# Patient Record
Sex: MALE | Race: WHITE | Employment: FULL TIME | URBAN - METROPOLITAN AREA
[De-identification: names, ages, dates, MRNs, and addresses within clinical notes are randomized per-mention and may not be internally consistent; named-entity substitution may affect disease eponyms.]

---

## 2017-01-25 ENCOUNTER — TELEPHONE (OUTPATIENT)
Dept: FAMILY MEDICINE CLINIC | Facility: CLINIC | Age: 42
End: 2017-01-25

## 2017-01-25 DIAGNOSIS — Z13.220 LIPID SCREENING: Primary | ICD-10-CM

## 2017-01-25 NOTE — TELEPHONE ENCOUNTER
Physical Exam on 3/31/17. ..no changes in health and will go to [facility] for bld work. Place labs please

## 2017-02-23 NOTE — TELEPHONE ENCOUNTER
Pt requesting refill on inhaler, has physical scheduled on 3/31/17, no recent aap or qualifying act score. Will you authorize refill? Please advise.  Routed to Dr Cyrus Fofana

## 2017-03-13 ENCOUNTER — APPOINTMENT (OUTPATIENT)
Dept: LAB | Age: 42
End: 2017-03-13
Attending: FAMILY MEDICINE
Payer: COMMERCIAL

## 2017-03-13 DIAGNOSIS — Z13.220 LIPID SCREENING: ICD-10-CM

## 2017-03-13 LAB
ALBUMIN SERPL-MCNC: 4.2 G/DL (ref 3.5–4.8)
ALP LIVER SERPL-CCNC: 68 U/L (ref 45–117)
ALT SERPL-CCNC: 57 U/L (ref 17–63)
AST SERPL-CCNC: 21 U/L (ref 15–41)
BILIRUB SERPL-MCNC: 0.6 MG/DL (ref 0.1–2)
BUN BLD-MCNC: 16 MG/DL (ref 8–20)
CALCIUM BLD-MCNC: 9.4 MG/DL (ref 8.3–10.3)
CHLORIDE: 105 MMOL/L (ref 101–111)
CHOLEST SMN-MCNC: 220 MG/DL (ref ?–200)
CO2: 27 MMOL/L (ref 22–32)
CREAT BLD-MCNC: 1.03 MG/DL (ref 0.7–1.3)
GLUCOSE BLD-MCNC: 91 MG/DL (ref 70–99)
HDLC SERPL-MCNC: 58 MG/DL (ref 45–?)
HDLC SERPL: 3.79 {RATIO} (ref ?–4.97)
LDLC SERPL CALC-MCNC: 128 MG/DL (ref ?–130)
M PROTEIN MFR SERPL ELPH: 7.5 G/DL (ref 6.1–8.3)
NONHDLC SERPL-MCNC: 162 MG/DL (ref ?–130)
POTASSIUM SERPL-SCNC: 3.9 MMOL/L (ref 3.6–5.1)
SODIUM SERPL-SCNC: 140 MMOL/L (ref 136–144)
TRIGLYCERIDES: 172 MG/DL (ref ?–150)
VLDL: 34 MG/DL (ref 5–40)

## 2017-03-13 PROCEDURE — 80061 LIPID PANEL: CPT

## 2017-03-13 PROCEDURE — 36415 COLL VENOUS BLD VENIPUNCTURE: CPT

## 2017-03-13 PROCEDURE — 80053 COMPREHEN METABOLIC PANEL: CPT

## 2017-03-31 ENCOUNTER — OFFICE VISIT (OUTPATIENT)
Dept: FAMILY MEDICINE CLINIC | Facility: CLINIC | Age: 42
End: 2017-03-31

## 2017-03-31 VITALS
RESPIRATION RATE: 12 BRPM | HEART RATE: 84 BPM | SYSTOLIC BLOOD PRESSURE: 122 MMHG | DIASTOLIC BLOOD PRESSURE: 64 MMHG | BODY MASS INDEX: 28.1 KG/M2 | HEIGHT: 73 IN | TEMPERATURE: 98 F | WEIGHT: 212 LBS

## 2017-03-31 DIAGNOSIS — J30.2 OTHER SEASONAL ALLERGIC RHINITIS: ICD-10-CM

## 2017-03-31 DIAGNOSIS — Z00.00 ANNUAL PHYSICAL EXAM: Primary | ICD-10-CM

## 2017-03-31 DIAGNOSIS — G47.33 OBSTRUCTIVE SLEEP APNEA: ICD-10-CM

## 2017-03-31 PROCEDURE — 99396 PREV VISIT EST AGE 40-64: CPT | Performed by: FAMILY MEDICINE

## 2017-03-31 NOTE — PROGRESS NOTES
No chief complaint on file. HPI:   Ankita Servin is a 40year old male with MPH JUAN, rhinitis who presents for a complete physical exam.     JUAN - on CPAP. He goes on and off this, most recently off of the mask. PNA end of last year. PILONIDAL LESION COMPLIC  [id number]    ARTHROSCOPY OF JOINT UNLISTED  1992    Comment left knee      Family History   Problem Relation Age of Onset   • Cancer Maternal Grandmother    • Other[other] [OTHER] Maternal Grandfather [de-identified]     alzheimers   • Heart Disor strength and tone. Normal symmetric reflexes. Normal coordination and gait    ASSESSMENT AND PLAN:     Conchis Ba was seen in the office today:  had no chief complaint listed for this encounter.     1. Annual physical exam  overallhealthy  Die

## 2017-03-31 NOTE — PATIENT INSTRUCTIONS
Prevention Guidelines, Men Ages 36 to 52  Screening tests and vaccines are an important part of managing your health. Health counseling is essential, too. Below are guidelines for these, for men ages 36 to 52.  Talk with your healthcare provider to make s Hepatitis A Men at increased risk for infection – talk with your healthcare provider 2 doses given at least 6 months apart   Hepatitis B Men at increased risk for infection – talk with your healthcare provider 3 doses over 6 months; second dose should be g © 3856-5962 [address], [address] [place]. All rights reserved. This information is not intended as a substitute for professional medical care. Always follow your healthcare professional's instructions.

## 2017-04-27 ENCOUNTER — HOSPITAL ENCOUNTER (OUTPATIENT)
Dept: GENERAL RADIOLOGY | Age: 42
Discharge: HOME | End: 2017-04-27
Attending: PHYSICIAN ASSISTANT
Payer: COMMERCIAL

## 2017-04-27 ENCOUNTER — OFFICE VISIT (OUTPATIENT)
Dept: FAMILY MEDICINE CLINIC | Facility: CLINIC | Age: 42
End: 2017-04-27

## 2017-04-27 VITALS
HEART RATE: 80 BPM | HEIGHT: 73 IN | DIASTOLIC BLOOD PRESSURE: 80 MMHG | SYSTOLIC BLOOD PRESSURE: 122 MMHG | RESPIRATION RATE: 16 BRPM | TEMPERATURE: 98 F | WEIGHT: 213 LBS | BODY MASS INDEX: 28.23 KG/M2

## 2017-04-27 DIAGNOSIS — M54.2 NECK PAIN: Primary | ICD-10-CM

## 2017-04-27 DIAGNOSIS — M25.512 ACUTE PAIN OF LEFT SHOULDER: ICD-10-CM

## 2017-04-27 DIAGNOSIS — M54.2 NECK PAIN: ICD-10-CM

## 2017-04-27 DIAGNOSIS — M54.12 CERVICAL RADICULOPATHY: ICD-10-CM

## 2017-04-27 DIAGNOSIS — M79.602 LEFT ARM PAIN: ICD-10-CM

## 2017-04-27 PROCEDURE — 72050 X-RAY EXAM NECK SPINE 4/5VWS: CPT

## 2017-04-27 PROCEDURE — 99213 OFFICE O/P EST LOW 20 MIN: CPT | Performed by: PHYSICIAN ASSISTANT

## 2017-04-27 RX ORDER — CYCLOBENZAPRINE HCL 10 MG
10 TABLET ORAL 3 TIMES DAILY
Qty: 30 TABLET | Refills: 0 | Status: SHIPPED | OUTPATIENT
Start: 2017-04-27 | End: 2017-05-17

## 2017-04-27 RX ORDER — METHYLPREDNISOLONE 4 MG/1
TABLET ORAL
Qty: 1 KIT | Refills: 0 | Status: SHIPPED | OUTPATIENT
Start: 2017-04-27 | End: 2017-06-12

## 2017-04-27 NOTE — PROGRESS NOTES
CC:  Patient presents with:  Shoulder Pain: x1 week w/ left shoulder pain . Pt states dull ache and certian movements increas pain that go down entire arm. Pain level 8-9/10.        HPI: Diogenes Sanchez presents for complaints of left neck and shoulder pain extendin HPI    Physical Exam :  /80 mmHg  Pulse 80  Temp(Src) 98.3 °F (36.8 °C)  Resp 16  Ht 73\"  Wt 213 lb  BMI 28.11 kg/m2    Vital signs reviewed.     Constitutional: Well developed, well nourished; in no acute distress  HEENT:  Head: Normocephalic, atrau Allergic rhinitis      No orders of the defined types were placed in this encounter. Signed Prescriptions Disp Refills    Cyclobenzaprine HCl 10 MG Oral Tab 30 tablet 0      Sig: Take 1 tablet (10 mg total) by mouth 3 (three) times daily.       jim

## 2017-04-27 NOTE — PATIENT INSTRUCTIONS
Understanding Cervical Radiculopathy    Cervical radiculopathy is irritation or inflammation of a nerve root in the neck. It causes neck pain and other symptoms that may spread into the chest or down the arm.  To understand this condition, it helps to und In most cases, your healthcare provider will first try treatments that help relieve symptoms. These may include:  · Prescription or over-the-counter pain medicines. These help relieve pain and swelling. · Cold packs. These help reduce pain. · Resting.  Cynthia Barraganast

## 2017-05-04 ENCOUNTER — TELEPHONE (OUTPATIENT)
Dept: FAMILY MEDICINE CLINIC | Facility: CLINIC | Age: 42
End: 2017-05-04

## 2017-05-04 NOTE — TELEPHONE ENCOUNTER
Medication is still not working on the pain. It is reduced but not completely healed what is the next step? Physical Therapy? He saw [facility] on 4/27/17.

## 2017-05-04 NOTE — TELEPHONE ENCOUNTER
LOV 4/27/17. Pt states that his neck pain is improving, but it is still present. Level 3-4 intermittent pain. Pt is asking if he can go for physical therapy.     Routed to [facility]

## 2017-05-08 ENCOUNTER — HOSPITAL ENCOUNTER (OUTPATIENT)
Dept: PHYSICAL THERAPY | Facility: HOSPITAL | Age: 42
Setting detail: THERAPIES SERIES
Discharge: HOME | End: 2017-05-08
Attending: PHYSICIAN ASSISTANT
Payer: COMMERCIAL

## 2017-05-08 DIAGNOSIS — M54.12 LEFT CERVICAL RADICULOPATHY: ICD-10-CM

## 2017-05-08 DIAGNOSIS — M54.2 NECK PAIN ON LEFT SIDE: Primary | ICD-10-CM

## 2017-05-08 PROCEDURE — 97110 THERAPEUTIC EXERCISES: CPT

## 2017-05-08 PROCEDURE — 97161 PT EVAL LOW COMPLEX 20 MIN: CPT

## 2017-05-08 NOTE — PROGRESS NOTES
SPINE EVALUATION:   Referring Physician: Dr. Evert Spaulding  Diagnosis: Neck pain with L Cervical Radiculopathy     Date of Service: 5/8/2017     PATIENT SUMMARY   Devin Merchant is a 40year old y/o male who presents to therapy today with complaints R 62; L 58    Accessory motion: decreased segmental L rotation, decreased segmental lateral glide, decreased thoracic PA mobility  Palpation: tightness/tenderness present throughout cervical paraspinals, L UT, L levator, and L parascapular region    Georgiana Aranda Potential:good    Patient/Family/Caregiver was advised of these findings, precautions, and treatment options and has agreed to actively participate in planning and for this course of care.     Thank you for your referral. Please co-sign or sign and return t

## 2017-05-15 ENCOUNTER — HOSPITAL ENCOUNTER (OUTPATIENT)
Dept: PHYSICAL THERAPY | Facility: HOSPITAL | Age: 42
Setting detail: THERAPIES SERIES
Discharge: HOME | End: 2017-05-15
Attending: PHYSICIAN ASSISTANT
Payer: COMMERCIAL

## 2017-05-15 PROCEDURE — 97110 THERAPEUTIC EXERCISES: CPT

## 2017-05-15 PROCEDURE — 97140 MANUAL THERAPY 1/> REGIONS: CPT

## 2017-05-15 NOTE — PROGRESS NOTES
Dx: neck pain L       Authorized # of Visits: -       Next MD visit: none scheduled  Fall Risk: standard         Precautions: n/a             Subjective: felt good for the first couple days after treatment but then pain returned to pre-treatment level.  Bobby

## 2017-05-22 ENCOUNTER — APPOINTMENT (OUTPATIENT)
Dept: PHYSICAL THERAPY | Facility: HOSPITAL | Age: 42
End: 2017-05-22
Attending: PHYSICIAN ASSISTANT
Payer: COMMERCIAL

## 2017-05-25 ENCOUNTER — HOSPITAL ENCOUNTER (OUTPATIENT)
Dept: PHYSICAL THERAPY | Facility: HOSPITAL | Age: 42
Setting detail: THERAPIES SERIES
Discharge: HOME | End: 2017-05-25
Attending: PHYSICIAN ASSISTANT
Payer: COMMERCIAL

## 2017-05-25 PROCEDURE — 97012 MECHANICAL TRACTION THERAPY: CPT

## 2017-05-25 PROCEDURE — 97110 THERAPEUTIC EXERCISES: CPT

## 2017-05-25 PROCEDURE — 97140 MANUAL THERAPY 1/> REGIONS: CPT

## 2017-05-25 NOTE — PROGRESS NOTES
Dx: neck pain w/ L cervical radiculopathy         Authorized # of Visits:  [facility] required after 25th visit         Next MD visit: none scheduled  Fall Risk: standard         Precautions: n/a             Subjective: Patient reports he is noting improved cerv

## 2017-06-01 ENCOUNTER — HOSPITAL ENCOUNTER (OUTPATIENT)
Dept: PHYSICAL THERAPY | Facility: HOSPITAL | Age: 42
Setting detail: THERAPIES SERIES
Discharge: HOME | End: 2017-06-01
Attending: PHYSICIAN ASSISTANT
Payer: COMMERCIAL

## 2017-06-01 ENCOUNTER — TELEPHONE (OUTPATIENT)
Dept: FAMILY MEDICINE CLINIC | Facility: CLINIC | Age: 42
End: 2017-06-01

## 2017-06-01 PROCEDURE — 97110 THERAPEUTIC EXERCISES: CPT

## 2017-06-01 PROCEDURE — 97012 MECHANICAL TRACTION THERAPY: CPT

## 2017-06-01 PROCEDURE — 97140 MANUAL THERAPY 1/> REGIONS: CPT

## 2017-06-01 NOTE — PROGRESS NOTES
Dx: neck pain w/ L cervical radiculopathy         Authorized # of Visits:  Irvin Joiner required after 25th visit         Next MD visit: none scheduled  Fall Risk: standard         Precautions: n/a             Subjective:  Patient reports the pain in his neck is d 2x10         BTB scap retrxn upright row 2x15                              Charges: Man, JASMYN, TRXN  Total Timed Treatment: 45 min     Total Treatment Time: 45 min

## 2017-06-02 NOTE — TELEPHONE ENCOUNTER
Called [facility] to call back to make an appointment with Fred Ashley PA-c

## 2017-06-05 ENCOUNTER — APPOINTMENT (OUTPATIENT)
Dept: PHYSICAL THERAPY | Facility: HOSPITAL | Age: 42
End: 2017-06-05
Attending: PHYSICIAN ASSISTANT
Payer: COMMERCIAL

## 2017-06-08 ENCOUNTER — APPOINTMENT (OUTPATIENT)
Dept: PHYSICAL THERAPY | Facility: HOSPITAL | Age: 42
End: 2017-06-08
Attending: PHYSICIAN ASSISTANT
Payer: COMMERCIAL

## 2017-06-12 ENCOUNTER — OFFICE VISIT (OUTPATIENT)
Dept: FAMILY MEDICINE CLINIC | Facility: CLINIC | Age: 42
End: 2017-06-12

## 2017-06-12 VITALS
BODY MASS INDEX: 28.67 KG/M2 | RESPIRATION RATE: 16 BRPM | SYSTOLIC BLOOD PRESSURE: 110 MMHG | WEIGHT: 214 LBS | DIASTOLIC BLOOD PRESSURE: 70 MMHG | TEMPERATURE: 98 F | HEIGHT: 72.5 IN | HEART RATE: 84 BPM

## 2017-06-12 DIAGNOSIS — M54.12 CERVICAL RADICULOPATHY: ICD-10-CM

## 2017-06-12 DIAGNOSIS — M54.2 NECK PAIN: Primary | ICD-10-CM

## 2017-06-12 PROCEDURE — 99213 OFFICE O/P EST LOW 20 MIN: CPT | Performed by: PHYSICIAN ASSISTANT

## 2017-06-12 NOTE — PROGRESS NOTES
CC:  Patient presents with:  Neck Pain: since starting PT two weeks ago, pain in neck has become worse, wants to discuss MRI      HPI: Naa Guzman presents with complaints of L neck pain with numbness and tingling into his left arm.  XRs were negative a few week perfusion  Pulmonary/Chest: No audible wheezing or labored breathing  Abdominal: No distention  Musculoskeletal:  Normal ROM  Neurological: A&O x 3   Skin: Warm, dry; no rashes, lesions, or discoloration  Psychiatric: Normal mood, affect, behavior, judgeme

## 2017-06-15 ENCOUNTER — APPOINTMENT (OUTPATIENT)
Dept: PHYSICAL THERAPY | Facility: HOSPITAL | Age: 42
End: 2017-06-15
Attending: PHYSICIAN ASSISTANT
Payer: COMMERCIAL

## 2017-06-18 ENCOUNTER — HOSPITAL ENCOUNTER (OUTPATIENT)
Dept: MRI IMAGING | Facility: HOSPITAL | Age: 42
Discharge: HOME | End: 2017-06-18
Attending: PHYSICIAN ASSISTANT
Payer: COMMERCIAL

## 2017-06-18 DIAGNOSIS — M54.12 CERVICAL RADICULOPATHY: ICD-10-CM

## 2017-06-18 DIAGNOSIS — M54.2 NECK PAIN: ICD-10-CM

## 2017-06-18 PROCEDURE — 72141 MRI NECK SPINE W/O DYE: CPT | Performed by: PHYSICIAN ASSISTANT

## 2017-06-22 ENCOUNTER — HOSPITAL ENCOUNTER (OUTPATIENT)
Dept: PHYSICAL THERAPY | Facility: HOSPITAL | Age: 42
Setting detail: THERAPIES SERIES
Discharge: HOME | End: 2017-06-22
Attending: PHYSICIAN ASSISTANT
Payer: COMMERCIAL

## 2017-06-22 PROCEDURE — 97012 MECHANICAL TRACTION THERAPY: CPT

## 2017-06-22 PROCEDURE — 97140 MANUAL THERAPY 1/> REGIONS: CPT

## 2017-06-22 NOTE — PROGRESS NOTES
Dx: neck pain w/ L cervical radiculopathy         Authorized # of Visits:  Naoma John required after 25th visit         Next MD visit: none scheduled  Fall Risk: standard         Precautions: n/a             Subjective:  Patient reports the achy pain in the left TRXN  Total Timed Treatment: 45 min     Total Treatment Time: 45 min

## 2017-06-29 ENCOUNTER — HOSPITAL ENCOUNTER (OUTPATIENT)
Dept: PHYSICAL THERAPY | Facility: HOSPITAL | Age: 42
Setting detail: THERAPIES SERIES
Discharge: HOME | End: 2017-06-29
Attending: PHYSICIAN ASSISTANT
Payer: COMMERCIAL

## 2017-06-29 ENCOUNTER — TELEPHONE (OUTPATIENT)
Dept: FAMILY MEDICINE CLINIC | Facility: CLINIC | Age: 42
End: 2017-06-29

## 2017-06-29 DIAGNOSIS — M54.12 CERVICAL RADICULOPATHY: Primary | ICD-10-CM

## 2017-06-29 PROCEDURE — 97140 MANUAL THERAPY 1/> REGIONS: CPT

## 2017-06-29 PROCEDURE — 97012 MECHANICAL TRACTION THERAPY: CPT

## 2017-06-29 NOTE — TELEPHONE ENCOUNTER
Sravanthi Masters physical therapist from [facility] would like to speak to nurse regarding a prescription for cervical home traction unit.

## 2017-06-29 NOTE — TELEPHONE ENCOUNTER
Physical therapist calling to request DME order for a cervical home traction unit, dx cervical radiculopathy.     Routed to Lorena Crane PA-C for review

## 2017-06-29 NOTE — PROGRESS NOTES
Dx: neck pain w/ L cervical radiculopathy         Authorized # of Visits:  Joelle Campos required after 25th visit         Next MD visit: none scheduled  Fall Risk: standard         Precautions: n/a             Subjective:  Patient reports he feel she continues to Cervical PROM: flxn, SB, rot      L UE neurodynamcs L UE neurodynamcs L UE neurodynamcs L UE neurodynamcs      Mechanical home cervical traction: 10 min @ 25-30# sustained pressure Mechanical home cervical traction: 10 min @ 25-30# sustained pressure Prone

## 2017-07-03 ENCOUNTER — APPOINTMENT (OUTPATIENT)
Dept: PHYSICAL THERAPY | Facility: HOSPITAL | Age: 42
End: 2017-07-03
Attending: PHYSICIAN ASSISTANT
Payer: COMMERCIAL

## 2017-07-13 ENCOUNTER — HOSPITAL ENCOUNTER (OUTPATIENT)
Dept: PHYSICAL THERAPY | Facility: HOSPITAL | Age: 42
Setting detail: THERAPIES SERIES
Discharge: HOME | End: 2017-07-13
Attending: PHYSICIAN ASSISTANT
Payer: COMMERCIAL

## 2017-07-13 PROCEDURE — 97140 MANUAL THERAPY 1/> REGIONS: CPT

## 2017-07-13 PROCEDURE — 97012 MECHANICAL TRACTION THERAPY: CPT

## 2017-07-13 NOTE — PROGRESS NOTES
Dx: neck pain w/ L cervical radiculopathy         Authorized # of Visits:  Donald Turner required after 25th visit         Next MD visit: none scheduled  Fall Risk: standard         Precautions: n/a             Subjective: Patient reports his neck has been doing be stretch : T/Y GTB scap retrxn B ER 2x10 Mechanical home cervical traction: 10 min @ 25-30# sustained pressure Mechanical home cervical traction: 10 min @ 25-30# sustained pressure Mechanical home cervical traction: 10 min @ 25-30# sustained pressure      G

## 2017-10-16 ENCOUNTER — OFFICE VISIT (OUTPATIENT)
Dept: FAMILY MEDICINE CLINIC | Facility: CLINIC | Age: 42
End: 2017-10-16

## 2017-10-16 VITALS
TEMPERATURE: 99 F | BODY MASS INDEX: 27.7 KG/M2 | HEIGHT: 73 IN | HEART RATE: 72 BPM | WEIGHT: 209 LBS | DIASTOLIC BLOOD PRESSURE: 86 MMHG | SYSTOLIC BLOOD PRESSURE: 126 MMHG | RESPIRATION RATE: 16 BRPM

## 2017-10-16 DIAGNOSIS — J02.9 ST (SORE THROAT): Primary | ICD-10-CM

## 2017-10-16 DIAGNOSIS — R09.81 NASAL CONGESTION: ICD-10-CM

## 2017-10-16 DIAGNOSIS — R05.9 COUGH: ICD-10-CM

## 2017-10-16 DIAGNOSIS — J34.89 SINUS PRESSURE: ICD-10-CM

## 2017-10-16 PROCEDURE — 99213 OFFICE O/P EST LOW 20 MIN: CPT | Performed by: PHYSICIAN ASSISTANT

## 2017-10-16 NOTE — PROGRESS NOTES
CC:  Patient presents with:  Sinus Problem: sinus congestion and drainage x 5 days, headache, sinus pressure  Diarrhea: off and on the last few weeks      HPI: Carmen Hawley presents with complaints of a mild ST, sinus pressure, nasal congestion, and a cough with No pain/weakness in arms/legs; normal range of motion  Skin: No rashes or new skin lesions; no unusual moles  Neurological:  No weakness, numbness, or tingling; normal gait and balance  Hematological:  No unusual bruises or bleeding  Psychiatric/Behavioral verbalizes understanding. Patient is notified to call with any questions, complications, allergies, or worsening or changing symptoms. Patient is to call with any side effects or complications from the treatments as a result of today.     Reviewed Past Med

## 2017-10-20 ENCOUNTER — HOSPITAL ENCOUNTER (OUTPATIENT)
Age: 42
Discharge: HOME | End: 2017-10-20
Attending: FAMILY MEDICINE
Payer: COMMERCIAL

## 2017-10-20 VITALS
HEIGHT: 70 IN | BODY MASS INDEX: 30.06 KG/M2 | WEIGHT: 210 LBS | SYSTOLIC BLOOD PRESSURE: 136 MMHG | DIASTOLIC BLOOD PRESSURE: 96 MMHG | TEMPERATURE: 98 F | OXYGEN SATURATION: 99 % | RESPIRATION RATE: 18 BRPM | HEART RATE: 99 BPM

## 2017-10-20 DIAGNOSIS — J01.90 ACUTE SINUSITIS, RECURRENCE NOT SPECIFIED, UNSPECIFIED LOCATION: Primary | ICD-10-CM

## 2017-10-20 DIAGNOSIS — R05.9 COUGH: ICD-10-CM

## 2017-10-20 PROCEDURE — 99214 OFFICE O/P EST MOD 30 MIN: CPT

## 2017-10-20 PROCEDURE — 99213 OFFICE O/P EST LOW 20 MIN: CPT

## 2017-10-20 RX ORDER — DOXYCYCLINE 100 MG/1
100 CAPSULE ORAL 2 TIMES DAILY
Qty: 20 CAPSULE | Refills: 0 | Status: SHIPPED | OUTPATIENT
Start: 2017-10-20 | End: 2017-10-30

## 2017-10-20 NOTE — ED INITIAL ASSESSMENT (HPI)
Pt has been coughing for the past week with sinus drainage. Pt states that he is coughing up mucous and his cough is congested. Pt states that he is traveling for work next week and wants to make sure that he is okay.

## 2017-10-20 NOTE — ED PROVIDER NOTES
Patient Seen in: [facility]    History   Patient presents with:  Cough/URI    Stated Complaint: cough x1 week    The history is provided by the patient. No  was used. Cough   This is a new problem.  Epi Comment: one or two a week      Review of Systems   Constitutional: Positive for fever. Negative for activity change. HENT: Positive for congestion (Purulent drainage), postnasal drip and sinus pressure.  Negative for facial swelling (but complains of fac no discharge. Left eye exhibits no discharge. Neck: Neck supple. Cardiovascular: Normal rate and regular rhythm. Pulmonary/Chest: Effort normal and breath sounds normal.   Lymphadenopathy:     He has no cervical adenopathy.    Neurological: He is veronica

## 2017-10-23 NOTE — TELEPHONE ENCOUNTER
Refill request sent from pharmacy for [facility]. LOV 10/16/17. Was inhaler Rx for Allergic rhinitis ? Routed to Dr Rajesh Green.

## 2017-12-19 ENCOUNTER — OFFICE VISIT (OUTPATIENT)
Dept: FAMILY MEDICINE CLINIC | Facility: CLINIC | Age: 42
End: 2017-12-19

## 2017-12-19 VITALS
BODY MASS INDEX: 27.83 KG/M2 | RESPIRATION RATE: 16 BRPM | DIASTOLIC BLOOD PRESSURE: 82 MMHG | TEMPERATURE: 99 F | HEART RATE: 74 BPM | WEIGHT: 210 LBS | HEIGHT: 73 IN | SYSTOLIC BLOOD PRESSURE: 118 MMHG

## 2017-12-19 DIAGNOSIS — K58.0 IRRITABLE BOWEL SYNDROME WITH DIARRHEA: Primary | ICD-10-CM

## 2017-12-19 PROCEDURE — 99214 OFFICE O/P EST MOD 30 MIN: CPT | Performed by: FAMILY MEDICINE

## 2017-12-19 NOTE — PROGRESS NOTES
Patient presents with:  Change of Bowel Habits: Pt has been having loose BMS, bodyaches and having trouble cathing his breath.  Thinks it is stress related      HPI:   Becky Winter is a 40year old male who presents to the office for stress otoole Work on finding time to do fun things - date nights, movie nights, play with children. Take advantage of time off  Healthy exercise is a good stress relief. Healthy diet  Probiotics may help  If persists, sometimes therapy helps.        Fabio Reynolds

## 2018-02-22 ENCOUNTER — TELEPHONE (OUTPATIENT)
Dept: FAMILY MEDICINE CLINIC | Facility: CLINIC | Age: 43
End: 2018-02-22

## 2018-02-22 DIAGNOSIS — Z13.228 SCREENING FOR ENDOCRINE, METABOLIC AND IMMUNITY DISORDER: ICD-10-CM

## 2018-02-22 DIAGNOSIS — Z13.29 SCREENING FOR ENDOCRINE, METABOLIC AND IMMUNITY DISORDER: ICD-10-CM

## 2018-02-22 DIAGNOSIS — Z13.220 SCREENING, LIPID: ICD-10-CM

## 2018-02-22 DIAGNOSIS — Z13.29 SCREENING FOR THYROID DISORDER: ICD-10-CM

## 2018-02-22 DIAGNOSIS — Z13.0 SCREENING FOR ENDOCRINE, METABOLIC AND IMMUNITY DISORDER: ICD-10-CM

## 2018-02-22 DIAGNOSIS — Z13.0 SCREENING, ANEMIA, DEFICIENCY, IRON: Primary | ICD-10-CM

## 2018-02-26 NOTE — TELEPHONE ENCOUNTER
Called [facility] in detail of labs placed and need to fast for 10-12 hours drink lots of water take his medications but don't eat or drink anything else

## 2018-04-04 ENCOUNTER — LABORATORY ENCOUNTER (OUTPATIENT)
Dept: LAB | Age: 43
End: 2018-04-04
Attending: FAMILY MEDICINE
Payer: COMMERCIAL

## 2018-04-04 DIAGNOSIS — Z13.29 SCREENING FOR ENDOCRINE, METABOLIC AND IMMUNITY DISORDER: ICD-10-CM

## 2018-04-04 DIAGNOSIS — Z13.0 SCREENING FOR ENDOCRINE, METABOLIC AND IMMUNITY DISORDER: ICD-10-CM

## 2018-04-04 DIAGNOSIS — Z13.0 SCREENING, ANEMIA, DEFICIENCY, IRON: ICD-10-CM

## 2018-04-04 DIAGNOSIS — Z13.29 SCREENING FOR THYROID DISORDER: ICD-10-CM

## 2018-04-04 DIAGNOSIS — Z13.220 SCREENING, LIPID: ICD-10-CM

## 2018-04-04 DIAGNOSIS — Z13.228 SCREENING FOR ENDOCRINE, METABOLIC AND IMMUNITY DISORDER: ICD-10-CM

## 2018-04-04 PROCEDURE — 36415 COLL VENOUS BLD VENIPUNCTURE: CPT | Performed by: FAMILY MEDICINE

## 2018-04-04 PROCEDURE — 80061 LIPID PANEL: CPT | Performed by: FAMILY MEDICINE

## 2018-04-04 PROCEDURE — 80050 GENERAL HEALTH PANEL: CPT | Performed by: FAMILY MEDICINE

## 2018-04-05 ENCOUNTER — TELEPHONE (OUTPATIENT)
Dept: FAMILY MEDICINE CLINIC | Facility: CLINIC | Age: 43
End: 2018-04-05

## 2018-04-05 NOTE — TELEPHONE ENCOUNTER
Medical Record Release received from [facility] Requesting pt's last 5 yrs of Medical Records. All Records located in [facility] in which request was sent to [facility] STAT.  Contact HARDIK Snyder at Ext [phone] CASE #[id number]

## 2018-04-06 ENCOUNTER — OFFICE VISIT (OUTPATIENT)
Dept: FAMILY MEDICINE CLINIC | Facility: CLINIC | Age: 43
End: 2018-04-06

## 2018-04-06 VITALS
RESPIRATION RATE: 16 BRPM | SYSTOLIC BLOOD PRESSURE: 130 MMHG | BODY MASS INDEX: 27.3 KG/M2 | DIASTOLIC BLOOD PRESSURE: 72 MMHG | HEIGHT: 73 IN | WEIGHT: 206 LBS | HEART RATE: 116 BPM | TEMPERATURE: 98 F

## 2018-04-06 DIAGNOSIS — G47.33 OSA ON CPAP: ICD-10-CM

## 2018-04-06 DIAGNOSIS — Z00.00 ANNUAL PHYSICAL EXAM: Primary | ICD-10-CM

## 2018-04-06 DIAGNOSIS — Z99.89 OSA ON CPAP: ICD-10-CM

## 2018-04-06 PROCEDURE — 99396 PREV VISIT EST AGE 40-64: CPT | Performed by: FAMILY MEDICINE

## 2018-04-06 NOTE — PROGRESS NOTES
Patient presents with:  Physical: Here for annual exam  bp 132/92 and 132/90  Blood Pressure: 132/92     HPI:   Pratima Muro is a 44year old male who presents for a complete physical exam. Feeling well overall. No big changes.      Last colono of pilonidal cyst 2007   • Perennial allergic rhinitis 3/20/2013      Past Surgical History:  1992: ARTHROSCOPY OF JOINT UNLISTED      Comment: left knee  2007: [address]   Family History   Problem Relation Age of Onset   • Cancer Mater coordination and gait      ASSESSMENT AND PLAN:     Stephie Alarcon was seen in the office today:  had concerns including Physical (Here for annual exam  bp 132/92 and 132/90) and Blood Pressure (132/92).     1. Annual physical exam  Overall health

## 2018-04-17 ENCOUNTER — TELEPHONE (OUTPATIENT)
Dept: FAMILY MEDICINE CLINIC | Facility: CLINIC | Age: 43
End: 2018-04-17

## 2018-04-17 DIAGNOSIS — G47.33 OBSTRUCTIVE SLEEP APNEA (ADULT) (PEDIATRIC): Primary | ICD-10-CM

## 2018-04-17 NOTE — TELEPHONE ENCOUNTER
Referral request for CPAP supplies from [address] [place] [address]  Patient last seen by Dr. Reji Villaseñor M.D. 4/6/18    Office notes from Dr. Reji Villaseñor M.D. 4/6/18  JUAN - on CPAP, irregular use but planning on restarting now that he has new supplies.    Supplies from [facility]

## 2018-04-17 NOTE — TELEPHONE ENCOUNTER
Authorized referral for [facility] [place] [facility] [place] for DME supplies faxed to [facility] 4/17/2018/johnathon

## 2018-05-25 ENCOUNTER — OFFICE VISIT (OUTPATIENT)
Dept: FAMILY MEDICINE CLINIC | Facility: CLINIC | Age: 43
End: 2018-05-25

## 2018-05-25 VITALS
TEMPERATURE: 99 F | BODY MASS INDEX: 27.96 KG/M2 | SYSTOLIC BLOOD PRESSURE: 120 MMHG | DIASTOLIC BLOOD PRESSURE: 78 MMHG | WEIGHT: 211 LBS | OXYGEN SATURATION: 98 % | HEIGHT: 73 IN | RESPIRATION RATE: 16 BRPM | HEART RATE: 116 BPM

## 2018-05-25 DIAGNOSIS — J01.00 ACUTE NON-RECURRENT MAXILLARY SINUSITIS: Primary | ICD-10-CM

## 2018-05-25 PROCEDURE — 99213 OFFICE O/P EST LOW 20 MIN: CPT | Performed by: FAMILY MEDICINE

## 2018-05-25 RX ORDER — DOXYCYCLINE HYCLATE 100 MG/1
100 CAPSULE ORAL 2 TIMES DAILY
Qty: 20 CAPSULE | Refills: 0 | Status: SHIPPED | OUTPATIENT
Start: 2018-05-25 | End: 2018-06-04

## 2018-05-25 NOTE — PATIENT INSTRUCTIONS
Take antibiotics with food and plenty of water. Eat yogurt or take probiotic daily. (Davidboo Rene is a good example of an OTC probiotic)  Make sure to finish the entire antibiotic treatment.   Increase fluids and rest.   Use otc meds as needed for comfort--  Johnathan

## 2018-05-26 NOTE — PROGRESS NOTES
CHIEF COMPLAINT:   Patient presents with:  Sinus Problem      HPI:   Dominique Hemphill is a 44year old male who presents for sinus congestion for  1  weeks. Symptoms have been worsening since onset.  Sinus congestion/pain is described as a pressure lesions  HEENT: See HPI. LUNGS: denies shortness of breath or wheezing, See HPI  CARDIOVASCULAR: denies chest pain or palpitations   GI: denies N/V/C or abdominal pain  NEURO: + sinus headaches. No numbness or tingling in face.     EXAM:   /78 (BP comfort--  Steroid nasal spray to reduce congestion. Saline nasal spray or rinses for nasal congestion and to thin secretions. Consider applying sterling's vapo-rub or eucalpytus oil to chest and feet at bedtime to reduce chest and nasal congestion.      Papito Jacinto

## 2018-05-29 ENCOUNTER — TELEPHONE (OUTPATIENT)
Dept: FAMILY MEDICINE CLINIC | Facility: CLINIC | Age: 43
End: 2018-05-29

## 2018-05-29 NOTE — TELEPHONE ENCOUNTER
Called and talked to patient suggested he take probiotic and take med with food he will try this and if still having problems call back for assistance

## 2018-05-29 NOTE — TELEPHONE ENCOUNTER
Patient was given an antibiotic by the walk in clinic on Friday for a sinus infection. Once on Saturday and Monday patient had cramping with diarrhea and is not sure if it's the antibiotic or if it could be something else developing.  Patient not sure what

## 2018-06-20 ENCOUNTER — TELEPHONE (OUTPATIENT)
Dept: FAMILY MEDICINE CLINIC | Facility: CLINIC | Age: 43
End: 2018-06-20

## 2018-06-20 NOTE — TELEPHONE ENCOUNTER
Patient had a recent cold was seen at walk in and given antibiotics, finished everything then had a lingering cough, still has cough and now having pain in the chest and on the sides.  Please call back at [phone]

## 2018-06-20 NOTE — TELEPHONE ENCOUNTER
Patient feels he is better but still has lingering cough with pain in ribs with the cough suggested he try delsym for the cough and a heating pad for the pain and if he runs fever or gets worse he needs to be seen

## 2018-08-27 ENCOUNTER — OFFICE VISIT (OUTPATIENT)
Dept: FAMILY MEDICINE CLINIC | Facility: CLINIC | Age: 43
End: 2018-08-27
Payer: COMMERCIAL

## 2018-08-27 VITALS
SYSTOLIC BLOOD PRESSURE: 128 MMHG | BODY MASS INDEX: 28.1 KG/M2 | RESPIRATION RATE: 16 BRPM | DIASTOLIC BLOOD PRESSURE: 70 MMHG | HEIGHT: 72.75 IN | WEIGHT: 212 LBS | HEART RATE: 84 BPM

## 2018-08-27 DIAGNOSIS — R03.0 ELEVATED BLOOD PRESSURE READING WITHOUT DIAGNOSIS OF HYPERTENSION: Primary | ICD-10-CM

## 2018-08-27 PROCEDURE — 99213 OFFICE O/P EST LOW 20 MIN: CPT | Performed by: FAMILY MEDICINE

## 2018-08-27 NOTE — PROGRESS NOTES
Patient presents with:  Blood Pressure: blood pressure trending high for the last two weeks     HPI:   Lauryn Rocha is a 44year old male who presents to the office for BP check. Pt noticed BPs running higher the last few weeks.     While horse

## 2018-10-05 ENCOUNTER — OFFICE VISIT (OUTPATIENT)
Dept: FAMILY MEDICINE CLINIC | Facility: CLINIC | Age: 43
End: 2018-10-05
Payer: COMMERCIAL

## 2018-10-05 VITALS
TEMPERATURE: 97 F | DIASTOLIC BLOOD PRESSURE: 64 MMHG | SYSTOLIC BLOOD PRESSURE: 138 MMHG | HEIGHT: 72.5 IN | RESPIRATION RATE: 16 BRPM | BODY MASS INDEX: 29.34 KG/M2 | WEIGHT: 219 LBS | HEART RATE: 92 BPM

## 2018-10-05 DIAGNOSIS — R07.89 ANTERIOR CHEST WALL PAIN: Primary | ICD-10-CM

## 2018-10-05 DIAGNOSIS — R10.11 RIGHT UPPER QUADRANT PAIN: ICD-10-CM

## 2018-10-05 PROCEDURE — 99214 OFFICE O/P EST MOD 30 MIN: CPT | Performed by: FAMILY MEDICINE

## 2018-10-05 RX ORDER — MELOXICAM 15 MG/1
15 TABLET ORAL DAILY
Qty: 30 TABLET | Refills: 0 | Status: SHIPPED | OUTPATIENT
Start: 2018-10-05 | End: 2018-10-30 | Stop reason: ALTCHOICE

## 2018-10-05 NOTE — PROGRESS NOTES
Becky Winter is a 44year old male. S:  Patient presents today with the following concerns:  · Right sided rib pain for 2-3 weeks. Awoke with the symptoms. Moves around the right anterior chest wall. Tender to palpation.   No pain with cecelia exertion  CARDIOVASCULAR: denies chest pain on exertion  GI: see above.   No N/V/D/C  : denies dysuria  MUSCULOSKELETAL: see above  NEURO: denies headaches    EXAM:  /64 (Cuff Size: large)   Pulse 92   Temp 97.3 °F (36.3 °C) (Oral)   Resp 16   Ht 72

## 2018-10-30 ENCOUNTER — OFFICE VISIT (OUTPATIENT)
Dept: FAMILY MEDICINE CLINIC | Facility: CLINIC | Age: 43
End: 2018-10-30
Payer: COMMERCIAL

## 2018-10-30 VITALS
HEART RATE: 68 BPM | BODY MASS INDEX: 28.87 KG/M2 | HEIGHT: 72.75 IN | TEMPERATURE: 98 F | SYSTOLIC BLOOD PRESSURE: 122 MMHG | RESPIRATION RATE: 12 BRPM | WEIGHT: 217.81 LBS | DIASTOLIC BLOOD PRESSURE: 74 MMHG

## 2018-10-30 DIAGNOSIS — M76.52 PATELLAR TENDONITIS OF LEFT KNEE: Primary | ICD-10-CM

## 2018-10-30 PROCEDURE — 90471 IMMUNIZATION ADMIN: CPT | Performed by: PHYSICIAN ASSISTANT

## 2018-10-30 PROCEDURE — 99213 OFFICE O/P EST LOW 20 MIN: CPT | Performed by: PHYSICIAN ASSISTANT

## 2018-10-30 PROCEDURE — 90686 IIV4 VACC NO PRSV 0.5 ML IM: CPT | Performed by: PHYSICIAN ASSISTANT

## 2018-10-30 RX ORDER — NAPROXEN 500 MG/1
500 TABLET ORAL 2 TIMES DAILY WITH MEALS
Qty: 30 TABLET | Refills: 0 | Status: SHIPPED | OUTPATIENT
Start: 2018-10-30

## 2018-10-30 NOTE — PROGRESS NOTES
Patient presents with:  Knee Pain: left x 1 week         HISTORY OF PRESENT ILLNESS  Kat Kitchen is a 44year old male who presents for evaluation of left knee pain. He started running outside three days a week about 2.5 weeks ago.  [facility] Procedure Laterality Date   • Arthroscopy of joint unlisted  1992    left knee   • Remv pilonidal lesion complic  8379       Social History    Tobacco Use      Smoking status: Former Smoker        Types: Cigarettes        Quit date: 10/5/1995        Year Lisa to have this done. Recommend that we start with supportive cares including rest, avoiding aggravating activities, knee brace for next 1 week or so, wearing supportive shoes, ice/heat.  I will also have him start naproxen 500 mg twice daily with food

## 2018-10-30 NOTE — PATIENT INSTRUCTIONS
Take Naproxen twice daily with food scheduled for 5 days then just as needed. Do not combine with other NSAIDs (Aleve, ibuprofen). Ok to alternate with tylenol if needed. Try ice/heat 15 minutes at least 3 times per day.     Start with knee exercises onc

## 2018-11-03 ENCOUNTER — HOSPITAL ENCOUNTER (OUTPATIENT)
Dept: ULTRASOUND IMAGING | Facility: HOSPITAL | Age: 43
Discharge: HOME | End: 2018-11-03
Attending: FAMILY MEDICINE
Payer: COMMERCIAL

## 2018-11-03 DIAGNOSIS — R10.11 RIGHT UPPER QUADRANT PAIN: ICD-10-CM

## 2018-11-03 PROCEDURE — 76700 US EXAM ABDOM COMPLETE: CPT | Performed by: FAMILY MEDICINE

## 2019-03-13 ENCOUNTER — TELEPHONE (OUTPATIENT)
Dept: FAMILY MEDICINE CLINIC | Facility: CLINIC | Age: 44
End: 2019-03-13

## 2019-03-13 DIAGNOSIS — Z13.220 LIPID SCREENING: Primary | ICD-10-CM

## 2019-03-13 NOTE — TELEPHONE ENCOUNTER
Please enter lab orders for the patient's upcoming physical appointment. Physical scheduled:    Your appointments     Date & Time Appointment Department [facility])    Apr 08, 2019  4:00 PM CDT Physical - Established Patient with Krys Leo MD Edwar

## 2019-04-04 ENCOUNTER — APPOINTMENT (OUTPATIENT)
Dept: LAB | Age: 44
End: 2019-04-04
Attending: FAMILY MEDICINE
Payer: COMMERCIAL

## 2019-04-04 DIAGNOSIS — Z13.220 LIPID SCREENING: ICD-10-CM

## 2019-04-04 PROCEDURE — 36415 COLL VENOUS BLD VENIPUNCTURE: CPT | Performed by: FAMILY MEDICINE

## 2019-04-04 PROCEDURE — 80061 LIPID PANEL: CPT | Performed by: FAMILY MEDICINE

## 2019-04-04 PROCEDURE — 80053 COMPREHEN METABOLIC PANEL: CPT | Performed by: FAMILY MEDICINE

## 2019-04-08 ENCOUNTER — OFFICE VISIT (OUTPATIENT)
Dept: FAMILY MEDICINE CLINIC | Facility: CLINIC | Age: 44
End: 2019-04-08
Payer: COMMERCIAL

## 2019-04-08 VITALS
DIASTOLIC BLOOD PRESSURE: 88 MMHG | TEMPERATURE: 98 F | HEART RATE: 100 BPM | SYSTOLIC BLOOD PRESSURE: 120 MMHG | HEIGHT: 72.5 IN | OXYGEN SATURATION: 98 % | WEIGHT: 224 LBS | RESPIRATION RATE: 16 BRPM | BODY MASS INDEX: 30.01 KG/M2

## 2019-04-08 DIAGNOSIS — Z23 NEED FOR DIPHTHERIA-TETANUS-PERTUSSIS (TDAP) VACCINE: ICD-10-CM

## 2019-04-08 DIAGNOSIS — S16.1XXA NECK STRAIN, INITIAL ENCOUNTER: ICD-10-CM

## 2019-04-08 DIAGNOSIS — R74.01 ELEVATED ALT MEASUREMENT: ICD-10-CM

## 2019-04-08 DIAGNOSIS — G47.33 OSA ON CPAP: ICD-10-CM

## 2019-04-08 DIAGNOSIS — Z00.00 ANNUAL PHYSICAL EXAM: Primary | ICD-10-CM

## 2019-04-08 DIAGNOSIS — Z99.89 OSA ON CPAP: ICD-10-CM

## 2019-04-08 PROCEDURE — 90715 TDAP VACCINE 7 YRS/> IM: CPT | Performed by: FAMILY MEDICINE

## 2019-04-08 PROCEDURE — 90471 IMMUNIZATION ADMIN: CPT | Performed by: FAMILY MEDICINE

## 2019-04-08 PROCEDURE — 99396 PREV VISIT EST AGE 40-64: CPT | Performed by: FAMILY MEDICINE

## 2019-04-08 NOTE — PROGRESS NOTES
Patient presents with:  Physical     HPI:   Sy Bella is a 38year old male who presents for a complete physical exam.     Last colonoscopy:  Did have one in 2003. Planning on next at 49. Last PSA:  Screening at 51.     Immunizations: flu 180 MG Oral Tab Take 180 mg by mouth daily as needed (seasonal allergies). Disp:  Rfl:    Multiple Vitamins-Minerals (MULTIVITAMIN OR) Take 1 tablet by mouth daily.  Disp:  Rfl:    naproxen 500 MG Oral Tab Take 1 tablet (500 mg total) by mouth 2 (two) times Septum midline. Mucosa normal. No drainage or sinus tenderness.   Throat: lips, mucosa, and tongue normal; teeth and gums normal  Neck: no adenopathy, no JVD, supple, symmetrical, trachea midline and thyroid not enlarged, symmetric, no tenderness/mass/nodul

## 2019-06-10 ENCOUNTER — TELEPHONE (OUTPATIENT)
Dept: FAMILY MEDICINE CLINIC | Facility: CLINIC | Age: 44
End: 2019-06-10

## 2019-06-11 NOTE — TELEPHONE ENCOUNTER
Patient was given a referral for a cervical home traction unit by Dr. Sabine Corona M.D. 4/8/2019. He was inquiring about this again today. I had spoken with [facility] who said that usually they see the patient and order this for them.   Once they receive the d

## 2019-07-12 ENCOUNTER — OFFICE VISIT (OUTPATIENT)
Dept: FAMILY MEDICINE CLINIC | Facility: CLINIC | Age: 44
End: 2019-07-12
Payer: COMMERCIAL

## 2019-07-12 VITALS
BODY MASS INDEX: 29.07 KG/M2 | WEIGHT: 219.31 LBS | HEART RATE: 87 BPM | DIASTOLIC BLOOD PRESSURE: 76 MMHG | RESPIRATION RATE: 16 BRPM | TEMPERATURE: 100 F | OXYGEN SATURATION: 99 % | SYSTOLIC BLOOD PRESSURE: 124 MMHG | HEIGHT: 73 IN

## 2019-07-12 DIAGNOSIS — J06.9 VIRAL URI WITH COUGH: ICD-10-CM

## 2019-07-12 DIAGNOSIS — J02.9 SORE THROAT: Primary | ICD-10-CM

## 2019-07-12 LAB
CONTROL LINE PRESENT WITH A CLEAR BACKGROUND (YES/NO): YES YES/NO
STREP GRP A CUL-SCR: NEGATIVE

## 2019-07-12 PROCEDURE — 87880 STREP A ASSAY W/OPTIC: CPT | Performed by: NURSE PRACTITIONER

## 2019-07-12 PROCEDURE — 99213 OFFICE O/P EST LOW 20 MIN: CPT | Performed by: NURSE PRACTITIONER

## 2019-07-12 NOTE — PROGRESS NOTES
CHIEF COMPLAINT:   Patient presents with:  Sore Throat: body aches, tired, sore throat x 2 days      HPI:   Samantha Dela Cruz is a 38year old male who presents for upper respiratory symptoms and sore throat x 3 days.  Sore throat more to right side denies shortness of breath or wheezing, See HPI  CARDIOVASCULAR: denies chest pain or palpitations   GI: denies N/V/C or abdominal pain  NEURO: Denies headaches    EXAM:   /76   Pulse 87   Temp 99.5 °F (37.5 °C) (Oral)   Resp 16   Ht 73\"   Wt 219 lb

## 2019-08-12 ENCOUNTER — TELEPHONE (OUTPATIENT)
Dept: FAMILY MEDICINE CLINIC | Facility: CLINIC | Age: 44
End: 2019-08-12

## 2019-08-12 NOTE — TELEPHONE ENCOUNTER
Called and talked to patient the red blotches have gone away now but he still has some swelling to his ankles not painful I asked him to elevate the feet today and if not better tomorrow to call office to be seen he agreed to try this.

## 2019-08-12 NOTE — TELEPHONE ENCOUNTER
Patient called states just came back from [facility] and noticed his feet and ankles are swollen with red blotches above ankles.  Asked if can speak with nurse

## 2019-11-19 ENCOUNTER — OFFICE VISIT (OUTPATIENT)
Dept: FAMILY MEDICINE CLINIC | Facility: CLINIC | Age: 44
End: 2019-11-19
Payer: COMMERCIAL

## 2019-11-19 ENCOUNTER — HOSPITAL ENCOUNTER (OUTPATIENT)
Dept: GENERAL RADIOLOGY | Age: 44
Discharge: HOME | End: 2019-11-19
Attending: NURSE PRACTITIONER
Payer: COMMERCIAL

## 2019-11-19 VITALS
HEIGHT: 73 IN | WEIGHT: 230.5 LBS | RESPIRATION RATE: 16 BRPM | HEART RATE: 100 BPM | DIASTOLIC BLOOD PRESSURE: 86 MMHG | SYSTOLIC BLOOD PRESSURE: 128 MMHG | BODY MASS INDEX: 30.55 KG/M2 | TEMPERATURE: 98 F

## 2019-11-19 DIAGNOSIS — M25.562 ACUTE PAIN OF LEFT KNEE: Primary | ICD-10-CM

## 2019-11-19 DIAGNOSIS — M79.89 LEG SWELLING: ICD-10-CM

## 2019-11-19 DIAGNOSIS — M25.562 ACUTE PAIN OF LEFT KNEE: ICD-10-CM

## 2019-11-19 PROCEDURE — 99214 OFFICE O/P EST MOD 30 MIN: CPT | Performed by: NURSE PRACTITIONER

## 2019-11-19 PROCEDURE — 73560 X-RAY EXAM OF KNEE 1 OR 2: CPT | Performed by: NURSE PRACTITIONER

## 2019-11-19 RX ORDER — NAPROXEN 500 MG/1
500 TABLET ORAL 2 TIMES DAILY WITH MEALS
Qty: 14 TABLET | Refills: 0 | Status: SHIPPED | OUTPATIENT
Start: 2019-11-19 | End: 2020-01-21

## 2019-11-19 NOTE — PROGRESS NOTES
Patient presents with:  Leg Pain: x 2 weeks, started behind the left knee and is now lower, sharp and achs      HPI:  Presents with approx 2 week history of left knee pain.  Pain is located behind knee and is a constant dull ache and causes him to limp at t tablet (500 mg total) by mouth 2 (two) times daily with meals. 30 tablet 0   • Probiotic Product (PROBIOTIC ADVANCED OR) Take by mouth daily. • TURMERIC OR Take by mouth daily.        • Fexofenadine HCl (ALLEGRA) 180 MG Oral Tab Take 180 mg by mouth d daily, until all tabs are gone. Space doses 12 hours apart for best effect. TAKE WITH FOOD. Do not take any Advil, Motrin, Aleve or ibuprofen products while taking this medication.          It is ok to take acetaminophen (Tylenol) while taking this medicati

## 2019-11-20 NOTE — PROGRESS NOTES
Discussed L Knee X-Ray results with Eduardo  by phone giving him Jonathan Zamora comments and recommendations for Naproxen,compression socks and to complete US as ordered. Eduardo verbalized understanding.

## 2019-11-25 ENCOUNTER — HOSPITAL ENCOUNTER (OUTPATIENT)
Dept: ULTRASOUND IMAGING | Age: 44
Discharge: HOME | End: 2019-11-25
Attending: NURSE PRACTITIONER
Payer: COMMERCIAL

## 2019-11-25 DIAGNOSIS — M79.89 LEG SWELLING: ICD-10-CM

## 2019-11-25 PROCEDURE — 93970 EXTREMITY STUDY: CPT | Performed by: NURSE PRACTITIONER

## 2019-11-26 NOTE — PROGRESS NOTES
Discussed US results with Eduardo by phone giving him Barak President FNP comments and recommendations to wear knee hi compression socks, lose weight loss and to RTO if his symptoms continue to be a problem or if new symptoms appear.  Eduardo verbalized understanding an

## 2020-01-21 ENCOUNTER — OFFICE VISIT (OUTPATIENT)
Dept: FAMILY MEDICINE CLINIC | Facility: CLINIC | Age: 45
End: 2020-01-21
Payer: COMMERCIAL

## 2020-01-21 VITALS
HEART RATE: 89 BPM | RESPIRATION RATE: 16 BRPM | SYSTOLIC BLOOD PRESSURE: 126 MMHG | WEIGHT: 222.38 LBS | BODY MASS INDEX: 29.47 KG/M2 | TEMPERATURE: 98 F | HEIGHT: 73 IN | DIASTOLIC BLOOD PRESSURE: 70 MMHG

## 2020-01-21 DIAGNOSIS — J01.40 SUBACUTE PANSINUSITIS: Primary | ICD-10-CM

## 2020-01-21 PROCEDURE — 99213 OFFICE O/P EST LOW 20 MIN: CPT | Performed by: PHYSICIAN ASSISTANT

## 2020-01-21 RX ORDER — DOXYCYCLINE HYCLATE 100 MG
100 TABLET ORAL 2 TIMES DAILY
Qty: 14 TABLET | Refills: 0 | Status: SHIPPED | OUTPATIENT
Start: 2020-01-21 | End: 2020-01-28

## 2020-01-21 NOTE — PROGRESS NOTES
Patient presents with:  Cold: lingering since fatemeh a bad taste in mouth, cough and headache 'allegra d, mucinex ,sudafed        HISTORY OF PRESENT ILLNESS  Markus Mackey is a 38year old male who presents for evaluation of cough, congestion use: Yes      Alcohol/week: 1.0 - 2.0 standard drinks      Types: 1 - 2 Standard drinks or equivalent per week      Frequency: Monthly or less      Drinks per session: 1 or 2      Binge frequency: Never      Comment: one or two a month    Drug use:  No

## 2020-02-06 ENCOUNTER — OFFICE VISIT (OUTPATIENT)
Dept: FAMILY MEDICINE CLINIC | Facility: CLINIC | Age: 45
End: 2020-02-06
Payer: COMMERCIAL

## 2020-02-06 VITALS
BODY MASS INDEX: 30.09 KG/M2 | SYSTOLIC BLOOD PRESSURE: 136 MMHG | WEIGHT: 227 LBS | RESPIRATION RATE: 17 BRPM | HEART RATE: 110 BPM | DIASTOLIC BLOOD PRESSURE: 74 MMHG | TEMPERATURE: 98 F | HEIGHT: 73 IN

## 2020-02-06 DIAGNOSIS — L82.0 SEBORRHEIC KERATOSES, INFLAMED: Primary | ICD-10-CM

## 2020-02-06 PROCEDURE — 99213 OFFICE O/P EST LOW 20 MIN: CPT | Performed by: FAMILY MEDICINE

## 2020-02-06 NOTE — PROGRESS NOTES
Patient presents with:  Spot: on chest, noticed it months ago, flaky and it bleed      HPI:   Sy Bella is a 38year old male who presents to the office for a spot on chest that is changing. First noticed around fall. No real symptoms.

## 2020-02-20 ENCOUNTER — OFFICE VISIT (OUTPATIENT)
Dept: FAMILY MEDICINE CLINIC | Facility: CLINIC | Age: 45
End: 2020-02-20
Payer: COMMERCIAL

## 2020-02-20 VITALS
OXYGEN SATURATION: 100 % | DIASTOLIC BLOOD PRESSURE: 74 MMHG | SYSTOLIC BLOOD PRESSURE: 128 MMHG | TEMPERATURE: 99 F | HEIGHT: 73 IN | HEART RATE: 80 BPM | WEIGHT: 226 LBS | RESPIRATION RATE: 18 BRPM | BODY MASS INDEX: 29.95 KG/M2

## 2020-02-20 DIAGNOSIS — H66.001 ACUTE SUPPURATIVE OTITIS MEDIA OF RIGHT EAR WITHOUT SPONTANEOUS RUPTURE OF TYMPANIC MEMBRANE, RECURRENCE NOT SPECIFIED: Primary | ICD-10-CM

## 2020-02-20 PROBLEM — H92.09 EAR PAIN: Status: ACTIVE | Noted: 2020-02-20

## 2020-02-20 PROCEDURE — 99213 OFFICE O/P EST LOW 20 MIN: CPT | Performed by: NURSE PRACTITIONER

## 2020-02-20 RX ORDER — DOXYCYCLINE HYCLATE 100 MG/1
100 CAPSULE ORAL 2 TIMES DAILY
Qty: 20 CAPSULE | Refills: 0 | Status: SHIPPED | OUTPATIENT
Start: 2020-02-20 | End: 2020-03-01

## 2020-02-20 NOTE — PROGRESS NOTES
CHIEF COMPLAINT:   Patient presents with:  Ear Pain: feels that ear is making throat hurt  Sore Throat      HPI:   Mary Alice Power is a 38year old male who presents to clinic today with complaints of right ear pain, that he thinks is making his Alcohol/week: 1.0 - 2.0 standard drinks      Types: 1 - 2 Standard drinks or equivalent per week      Frequency: Monthly or less      Drinks per session: 1 or 2      Binge frequency: Never      Comment: one or two a month    Drug use: No       REVIEW O - Comfort measures as described in Patient Instructions including tylenol/motrin prn pain.   - Advised F/U visit if no improvement/worsening/new symptoms such as fever or ear drainage within 3 days.  - Please remember that illnesses can change quickly, and · You may use over-the-counter medicine, such as acetaminophen or ibuprofen, to control pain and fever, unless something else was prescribed.  If you have chronic liver or kidney disease or have ever had a stomach ulcer or gastrointestinal bleeding, talk wi

## 2020-05-18 ENCOUNTER — TELEPHONE (OUTPATIENT)
Dept: FAMILY MEDICINE CLINIC | Facility: CLINIC | Age: 45
End: 2020-05-18

## 2020-05-18 NOTE — TELEPHONE ENCOUNTER
Was seen in Jan and Feb for sinus trouble they are going to [place] to care for his parents and was wondering what the benefit of antibody is and how accurate it is.  I explained that at this time that we are not doing any antibody testing due to the in accura

## 2020-05-18 NOTE — TELEPHONE ENCOUNTER
Pt thinks he had symptom back in Jan.and Feb (dry cough,headache no fever) requesting to speak to Dr Linus Cazares or a nurse

## 2020-09-09 ENCOUNTER — TELEPHONE (OUTPATIENT)
Dept: FAMILY MEDICINE CLINIC | Facility: CLINIC | Age: 45
End: 2020-09-09

## 2020-09-09 DIAGNOSIS — G47.33 OBSTRUCTIVE SLEEP APNEA: Primary | ICD-10-CM

## 2020-09-09 NOTE — TELEPHONE ENCOUNTER
Referral request [facility]  Fax number: [phone]     quantity 1 x 3 months    Diagnosis: Obstructive sleep apnea    Office notes from Renae Hawk NP 11/19/2019     Patient Active Problem List:     Obstructive sleep apnea

## 2021-02-03 ENCOUNTER — TELEPHONE (OUTPATIENT)
Dept: FAMILY MEDICINE CLINIC | Facility: CLINIC | Age: 46
End: 2021-02-03

## 2021-02-03 NOTE — TELEPHONE ENCOUNTER
Pt states he twisted his knee when he was camping last week and it is now swollen. Pt does not want to go to an ER or to a WIC. Pt also does not want to come in for an appointment because he does not live close by.  Pt wants to know what should he do withou

## 2021-02-03 NOTE — TELEPHONE ENCOUNTER
Was camping had twist to right knee which he has had surgery on as a teen. He is currently in [place] and I told him to go to urgent care to get x-ray and pain with knee brace to see what is wrong. He is not sure how long they will be in [place].  So I told

## 2022-01-11 ENCOUNTER — TELEPHONE (OUTPATIENT)
Dept: FAMILY MEDICINE CLINIC | Facility: CLINIC | Age: 47
End: 2022-01-11

## 2022-01-11 NOTE — TELEPHONE ENCOUNTER
Received medical records request from patient requesting all medical records for continuation of care as [place].  All records printed and mailed to [address] [place] [address] 5Th: Mani Steward, [address] [place] [id number]

## (undated) NOTE — MR AVS SNAPSHOT
[address] [facility] [address]  [facility],  [address] [facility] [phone]               Thank you for choosing us for your health care visit with Flash Nicole PA-C.   We are glad to serve you and happy to provide you with [facility] · The spinal canal. This is a tunnel formed within the stacked vertebrae. The spinal cord runs through this canal.  · Nerves. These branch off the spinal cord. As they leave the spinal canal, nerves pass through openings between the vertebrae.  The nerve ro · Shots of medicinesaround the nerve roots. This is done to help relieve symptoms for a time. In some cases, your healthcare provider may advise surgery to fix the underlying problem.  This depends on the cause, the symptoms, and how long the pain has last Where to Get Your Medications      These medications were sent to [facility] [address], [facility], [phone], [phone]  [address] [id number]    Hours:  24-hours Phone:  [phone]

## (undated) NOTE — MR AVS SNAPSHOT
[address] [facility] [address]  [facility],  [address]  [address] [place] [address] [phone]               Thank you for choosing us for your health care visit with Lluvia Guzman PA-C.   We are glad to serve you and happy to provide you with [facility] Cervical radiculopathy          Instructions and Information about Your Health     None      Allergies as of Jun 12, 2017     Amoxicillin Diarrhea                Today's Vital Signs     BP Pulse Temp Height Weight BMI    110/70 mmHg 84 98.2 °F (36.8 °C) (

## (undated) NOTE — MR AVS SNAPSHOT
[address] [facility] [address]  [facility],  [address]  [address] [place] [address] [phone]               Thank you for choosing us for your health care visit with Deepthi Tucker MD.  We are glad to serve you and happy to provide you with this s High cholesterol or triglycerides All men ages 28 and older, and younger men at high risk for coronary artery disease At least every 5 years   HIV All men At routine exams   Obesity All men in this age group At routine exams   Prostate cancer Starting at a healthcare provider PCV13: 1 dose ages 23 to 72 (protects against 13 types of pneumococcal bacteria)     PPSV23: 1 to 2 doses through age 59, or 1 dose at 72 or older (protects against 23 types of pneumococcal bacteria)      Tetanus/diphtheria/  pertussis Call the [employer] for assistance with your inactive [employer] account    If you have questions, you can call [phone] to talk to our [facility] Staff. Remember, [employer] is NOT to be used for urgent needs. For medical emergencies, dial 911.     Vi